# Patient Record
Sex: FEMALE | Race: WHITE | URBAN - METROPOLITAN AREA
[De-identification: names, ages, dates, MRNs, and addresses within clinical notes are randomized per-mention and may not be internally consistent; named-entity substitution may affect disease eponyms.]

---

## 2020-06-04 ENCOUNTER — SURGERY (OUTPATIENT)
Dept: URBAN - METROPOLITAN AREA SURGERY 19 | Facility: SURGERY | Age: 60
End: 2020-06-04
Payer: MEDICAID

## 2020-06-04 ENCOUNTER — NEW PATIENT (OUTPATIENT)
Dept: URBAN - METROPOLITAN AREA CLINIC 56 | Facility: CLINIC | Age: 60
End: 2020-06-04
Payer: MEDICAID

## 2020-06-04 DIAGNOSIS — H40.211 ACUTE ANGLE-CLOSURE GLAUCOMA, RIGHT EYE: ICD-10-CM

## 2020-06-04 DIAGNOSIS — R73.03 OTHER ABNORMAL GLUCOSE: Primary | ICD-10-CM

## 2020-06-04 PROCEDURE — 99203 OFFICE O/P NEW LOW 30 MIN: CPT | Performed by: OPHTHALMOLOGY

## 2020-06-04 PROCEDURE — 92250 FUNDUS PHOTOGRAPHY W/I&R: CPT | Performed by: OPHTHALMOLOGY

## 2020-06-04 PROCEDURE — 66761 REVISION OF IRIS: CPT | Performed by: OPHTHALMOLOGY

## 2020-06-04 ASSESSMENT — INTRAOCULAR PRESSURE
OD: 41
OD: 43
OD: 22
OD: 31
OS: 18
OD: 41
OD: 31
OD: 43
OD: 22
OS: 18

## 2020-06-05 ENCOUNTER — FOLLOW UP ESTABLISHED (OUTPATIENT)
Dept: URBAN - METROPOLITAN AREA CLINIC 10 | Facility: CLINIC | Age: 60
End: 2020-06-05
Payer: MEDICAID

## 2020-06-05 PROCEDURE — 92002 INTRM OPH EXAM NEW PATIENT: CPT | Performed by: OPTOMETRIST

## 2020-06-05 ASSESSMENT — INTRAOCULAR PRESSURE
OD: 12
OS: 12

## 2020-06-15 ENCOUNTER — SURGERY (OUTPATIENT)
Dept: URBAN - METROPOLITAN AREA SURGERY 19 | Facility: SURGERY | Age: 60
End: 2020-06-15
Payer: MEDICAID

## 2020-06-15 ENCOUNTER — FOLLOW UP ESTABLISHED (OUTPATIENT)
Dept: URBAN - METROPOLITAN AREA CLINIC 44 | Facility: CLINIC | Age: 60
End: 2020-06-15
Payer: MEDICAID

## 2020-06-15 PROCEDURE — 99024 POSTOP FOLLOW-UP VISIT: CPT | Performed by: OPHTHALMOLOGY

## 2020-06-15 PROCEDURE — 66761 REVISION OF IRIS: CPT | Performed by: OPHTHALMOLOGY

## 2020-06-15 PROCEDURE — 92020 GONIOSCOPY: CPT | Performed by: OPHTHALMOLOGY

## 2020-06-15 RX ORDER — PREDNISOLONE ACETATE 10 MG/ML
1 % SUSPENSION/ DROPS OPHTHALMIC
Qty: 1 | Refills: 4 | Status: INACTIVE
Start: 2020-06-15 | End: 2020-09-29

## 2020-06-15 RX ORDER — TRAVOPROST 0.04 MG/ML
0.004 % SOLUTION/ DROPS OPHTHALMIC
Qty: 1 | Refills: 3 | Status: INACTIVE
Start: 2020-06-15 | End: 2020-07-07

## 2020-06-15 RX ORDER — ACETAZOLAMIDE 250 MG/1
250 MG TABLET ORAL
Qty: 60 | Refills: 1 | Status: INACTIVE
Start: 2020-06-15 | End: 2020-06-16

## 2020-06-15 RX ORDER — BRINZOLAMIDE/BRIMONIDINE TARTRATE 10; 2 MG/ML; MG/ML
SUSPENSION/ DROPS OPHTHALMIC
Qty: 1 | Refills: 3 | Status: INACTIVE
Start: 2020-06-15 | End: 2020-09-29

## 2020-06-15 ASSESSMENT — INTRAOCULAR PRESSURE
OD: 15
OD: 15
OD: 45
OS: 18
OS: 18
OD: 45

## 2020-06-16 ENCOUNTER — FOLLOW UP ESTABLISHED (OUTPATIENT)
Dept: URBAN - METROPOLITAN AREA CLINIC 10 | Facility: CLINIC | Age: 60
End: 2020-06-16
Payer: MEDICAID

## 2020-06-16 PROCEDURE — 99024 POSTOP FOLLOW-UP VISIT: CPT | Performed by: OPHTHALMOLOGY

## 2020-06-16 RX ORDER — ACETAZOLAMIDE 250 MG/1
250 MG TABLET ORAL
Qty: 30 | Refills: 1 | Status: INACTIVE
Start: 2020-06-16 | End: 2020-10-01

## 2020-06-16 ASSESSMENT — INTRAOCULAR PRESSURE
OD: 12
OS: 14

## 2020-06-23 ENCOUNTER — SURGERY (OUTPATIENT)
Dept: URBAN - METROPOLITAN AREA SURGERY 5 | Facility: SURGERY | Age: 60
End: 2020-06-23
Payer: MEDICAID

## 2020-06-23 ENCOUNTER — POST OP (OUTPATIENT)
Dept: URBAN - METROPOLITAN AREA CLINIC 10 | Facility: CLINIC | Age: 60
End: 2020-06-23
Payer: MEDICAID

## 2020-06-23 DIAGNOSIS — H40.032 ANATOMICAL NARROW ANGLE, LEFT EYE: Primary | ICD-10-CM

## 2020-06-23 DIAGNOSIS — H25.13 AGE-RELATED NUCLEAR CATARACT, BILATERAL: ICD-10-CM

## 2020-06-23 PROCEDURE — 99024 POSTOP FOLLOW-UP VISIT: CPT | Performed by: OPHTHALMOLOGY

## 2020-06-23 PROCEDURE — 66761 REVISION OF IRIS: CPT | Performed by: OPHTHALMOLOGY

## 2020-06-23 ASSESSMENT — INTRAOCULAR PRESSURE
OD: 15
OS: 15
OS: 15
OD: 15

## 2020-06-30 ENCOUNTER — FOLLOW UP ESTABLISHED (OUTPATIENT)
Dept: URBAN - METROPOLITAN AREA CLINIC 10 | Facility: CLINIC | Age: 60
End: 2020-06-30
Payer: MEDICAID

## 2020-06-30 PROCEDURE — 99024 POSTOP FOLLOW-UP VISIT: CPT | Performed by: OPHTHALMOLOGY

## 2020-06-30 ASSESSMENT — KERATOMETRY
OS: 43.00
OD: 43.25

## 2020-06-30 ASSESSMENT — INTRAOCULAR PRESSURE
OS: 16
OD: 13

## 2020-07-06 ENCOUNTER — Encounter (OUTPATIENT)
Dept: URBAN - METROPOLITAN AREA CLINIC 10 | Facility: CLINIC | Age: 60
End: 2020-07-06
Payer: MEDICAID

## 2020-07-06 PROCEDURE — 92025 CPTRIZED CORNEAL TOPOGRAPHY: CPT | Performed by: OPHTHALMOLOGY

## 2020-07-06 PROCEDURE — 99213 OFFICE O/P EST LOW 20 MIN: CPT | Performed by: PHYSICIAN ASSISTANT

## 2020-07-07 ENCOUNTER — FOLLOW UP ESTABLISHED (OUTPATIENT)
Dept: URBAN - METROPOLITAN AREA CLINIC 10 | Facility: CLINIC | Age: 60
End: 2020-07-07
Payer: MEDICAID

## 2020-07-07 DIAGNOSIS — H52.223 REGULAR ASTIGMATISM, BILATERAL: ICD-10-CM

## 2020-07-07 PROCEDURE — 92083 EXTENDED VISUAL FIELD XM: CPT | Performed by: OPHTHALMOLOGY

## 2020-07-07 PROCEDURE — 92012 INTRM OPH EXAM EST PATIENT: CPT | Performed by: OPHTHALMOLOGY

## 2020-07-07 PROCEDURE — 92133 CPTRZD OPH DX IMG PST SGM ON: CPT | Performed by: OPHTHALMOLOGY

## 2020-07-07 PROCEDURE — 92134 CPTRZ OPH DX IMG PST SGM RTA: CPT | Performed by: OPHTHALMOLOGY

## 2020-07-07 PROCEDURE — 76514 ECHO EXAM OF EYE THICKNESS: CPT | Performed by: OPHTHALMOLOGY

## 2020-07-07 RX ORDER — TRAVOPROST 0.04 MG/ML
0.004 % SOLUTION/ DROPS OPHTHALMIC
Qty: 1 | Refills: 3 | Status: INACTIVE
Start: 2020-07-07 | End: 2020-09-29

## 2020-07-07 ASSESSMENT — VISUAL ACUITY
OS: 20/20
OD: 20/20

## 2020-07-14 ENCOUNTER — SURGERY (OUTPATIENT)
Dept: URBAN - METROPOLITAN AREA SURGERY 5 | Facility: SURGERY | Age: 60
End: 2020-07-14
Payer: MEDICAID

## 2020-07-14 PROCEDURE — 66984 XCAPSL CTRC RMVL W/O ECP: CPT | Performed by: OPHTHALMOLOGY

## 2020-07-15 ENCOUNTER — POST OP (OUTPATIENT)
Dept: URBAN - METROPOLITAN AREA CLINIC 10 | Facility: CLINIC | Age: 60
End: 2020-07-15

## 2020-07-15 PROCEDURE — 99024 POSTOP FOLLOW-UP VISIT: CPT | Performed by: OPTOMETRIST

## 2020-07-15 ASSESSMENT — INTRAOCULAR PRESSURE
OD: 13
OS: 12
OD: 12

## 2020-07-22 ENCOUNTER — POST OP (OUTPATIENT)
Dept: URBAN - METROPOLITAN AREA CLINIC 10 | Facility: CLINIC | Age: 60
End: 2020-07-22

## 2020-07-22 PROCEDURE — 99024 POSTOP FOLLOW-UP VISIT: CPT | Performed by: OPTOMETRIST

## 2020-07-22 ASSESSMENT — INTRAOCULAR PRESSURE
OS: 15
OD: 13

## 2020-07-22 ASSESSMENT — VISUAL ACUITY
OS: 20/25
OD: 20/25

## 2020-08-11 ENCOUNTER — POST OP (OUTPATIENT)
Dept: URBAN - METROPOLITAN AREA CLINIC 10 | Facility: CLINIC | Age: 60
End: 2020-08-11

## 2020-08-11 DIAGNOSIS — Z96.1 PRESENCE OF INTRAOCULAR LENS: Primary | ICD-10-CM

## 2020-08-11 PROCEDURE — 99024 POSTOP FOLLOW-UP VISIT: CPT | Performed by: OPTOMETRIST

## 2020-08-11 ASSESSMENT — VISUAL ACUITY
OS: 20/30
OD: 20/25

## 2020-08-11 ASSESSMENT — INTRAOCULAR PRESSURE
OD: 16
OS: 15

## 2020-09-29 ENCOUNTER — FOLLOW UP ESTABLISHED (OUTPATIENT)
Dept: URBAN - METROPOLITAN AREA CLINIC 44 | Facility: CLINIC | Age: 60
End: 2020-09-29
Payer: MEDICAID

## 2020-09-29 DIAGNOSIS — H25.12 AGE-RELATED NUCLEAR CATARACT, LEFT EYE: ICD-10-CM

## 2020-09-29 DIAGNOSIS — H26.491 OTHER SECONDARY CATARACT, RIGHT EYE: ICD-10-CM

## 2020-09-29 PROCEDURE — 92012 INTRM OPH EXAM EST PATIENT: CPT | Performed by: OPTOMETRIST

## 2020-09-29 PROCEDURE — 92134 CPTRZ OPH DX IMG PST SGM RTA: CPT | Performed by: OPTOMETRIST

## 2020-09-29 RX ORDER — URSODIOL 500 MG/1
500 MG TABLET ORAL
Qty: 0 | Refills: 0 | Status: ACTIVE
Start: 2020-09-29

## 2020-09-29 ASSESSMENT — INTRAOCULAR PRESSURE
OS: 14
OD: 11

## 2020-09-29 ASSESSMENT — VISUAL ACUITY
OD: 20/20
OS: 20/25

## 2020-09-29 ASSESSMENT — KERATOMETRY
OS: 42.25
OD: 42.50

## 2020-10-01 ENCOUNTER — Encounter (OUTPATIENT)
Dept: URBAN - METROPOLITAN AREA CLINIC 44 | Facility: CLINIC | Age: 60
End: 2020-10-01
Payer: MEDICAID

## 2020-10-01 PROCEDURE — 99213 OFFICE O/P EST LOW 20 MIN: CPT | Performed by: PHYSICIAN ASSISTANT

## 2020-10-08 ENCOUNTER — FOLLOW UP ESTABLISHED (OUTPATIENT)
Dept: URBAN - METROPOLITAN AREA CLINIC 44 | Facility: CLINIC | Age: 60
End: 2020-10-08
Payer: MEDICAID

## 2020-10-08 DIAGNOSIS — H04.123 AGE-RELATED NUCLEAR CATARACT, LEFT EYE: ICD-10-CM

## 2020-10-08 DIAGNOSIS — H52.222 REGULAR ASTIGMATISM, LEFT EYE: ICD-10-CM

## 2020-10-08 PROCEDURE — 92014 COMPRE OPH EXAM EST PT 1/>: CPT | Performed by: OPHTHALMOLOGY

## 2020-10-08 ASSESSMENT — INTRAOCULAR PRESSURE
OS: 16
OD: 13

## 2020-10-15 ENCOUNTER — SURGERY (OUTPATIENT)
Dept: URBAN - METROPOLITAN AREA SURGERY 19 | Facility: SURGERY | Age: 60
End: 2020-10-15
Payer: MEDICAID

## 2020-10-15 PROCEDURE — 66984 XCAPSL CTRC RMVL W/O ECP: CPT | Performed by: OPHTHALMOLOGY

## 2020-10-16 ENCOUNTER — POST OP (OUTPATIENT)
Dept: URBAN - METROPOLITAN AREA CLINIC 44 | Facility: CLINIC | Age: 60
End: 2020-10-16
Payer: MEDICAID

## 2020-10-16 PROCEDURE — 99024 POSTOP FOLLOW-UP VISIT: CPT | Performed by: OPTOMETRIST

## 2020-10-16 ASSESSMENT — INTRAOCULAR PRESSURE: OS: 15

## 2020-11-30 ENCOUNTER — POST OP (OUTPATIENT)
Dept: URBAN - METROPOLITAN AREA CLINIC 44 | Facility: CLINIC | Age: 60
End: 2020-11-30
Payer: MEDICAID

## 2020-11-30 PROCEDURE — 99024 POSTOP FOLLOW-UP VISIT: CPT | Performed by: OPTOMETRIST

## 2020-11-30 ASSESSMENT — INTRAOCULAR PRESSURE
OS: 18
OD: 18

## 2020-11-30 ASSESSMENT — VISUAL ACUITY
OD: 20/20
OS: 20/20

## 2020-12-21 ENCOUNTER — FOLLOW UP ESTABLISHED (OUTPATIENT)
Dept: URBAN - METROPOLITAN AREA CLINIC 44 | Facility: CLINIC | Age: 60
End: 2020-12-21
Payer: MEDICAID

## 2020-12-21 DIAGNOSIS — Z41.1 ENCOUNTER FOR COSMETIC SURGERY: ICD-10-CM

## 2020-12-21 PROCEDURE — 92081 LIMITED VISUAL FIELD XM: CPT | Performed by: OPHTHALMOLOGY

## 2020-12-21 PROCEDURE — 92285 EXTERNAL OCULAR PHOTOGRAPHY: CPT | Performed by: OPHTHALMOLOGY

## 2020-12-21 PROCEDURE — 99214 OFFICE O/P EST MOD 30 MIN: CPT | Performed by: OPHTHALMOLOGY

## 2020-12-21 RX ORDER — ERYTHROMYCIN 5 MG/G
OINTMENT OPHTHALMIC
Qty: 2 | Refills: 1 | Status: ACTIVE
Start: 2020-12-21

## 2021-02-16 ENCOUNTER — ADULT PHYSICAL (OUTPATIENT)
Dept: URBAN - METROPOLITAN AREA CLINIC 44 | Facility: CLINIC | Age: 61
End: 2021-02-16
Payer: MEDICAID

## 2021-02-16 DIAGNOSIS — Z01.818 ENCOUNTER FOR OTHER PREPROCEDURAL EXAMINATION: Primary | ICD-10-CM

## 2021-02-16 PROCEDURE — 99213 OFFICE O/P EST LOW 20 MIN: CPT | Performed by: PHYSICIAN ASSISTANT

## 2021-03-01 ENCOUNTER — SURGERY (OUTPATIENT)
Dept: URBAN - METROPOLITAN AREA SURGERY 19 | Facility: SURGERY | Age: 61
End: 2021-03-01
Payer: MEDICAID

## 2021-03-11 ENCOUNTER — POST OP (OUTPATIENT)
Dept: URBAN - METROPOLITAN AREA CLINIC 44 | Facility: CLINIC | Age: 61
End: 2021-03-11
Payer: MEDICAID

## 2021-03-11 DIAGNOSIS — H02.423 MYOGENIC PTOSIS OF BILATERAL EYELIDS: ICD-10-CM

## 2021-03-11 PROCEDURE — 99024 POSTOP FOLLOW-UP VISIT: CPT | Performed by: OPTOMETRIST

## 2021-05-27 ENCOUNTER — POST-OPERATIVE VISIT (OUTPATIENT)
Dept: URBAN - METROPOLITAN AREA CLINIC 44 | Facility: CLINIC | Age: 61
End: 2021-05-27
Payer: MEDICAID

## 2021-05-27 DIAGNOSIS — Z48.89 ENCOUNTER FOR OTHER SPECIFIED SURGICAL AFTERCARE: Primary | ICD-10-CM

## 2021-05-27 PROCEDURE — 99024 POSTOP FOLLOW-UP VISIT: CPT | Performed by: OPHTHALMOLOGY

## 2021-05-27 NOTE — IMPRESSION/PLAN
Impression: S/P External Levator Resection - Both Upper Lid OU - 87 Days. Encounter for other specified surgical aftercare  Z48.89. Excellent post op course   Post operative instructions reviewed - Plan: patient very happy with results. All questions answered. She would like to return for botox and filler. Plan for Restylane Kysse to lips (1 vial) and 9 units OU to crow's feet, 16 units to glabella, and 8-12 units in brow). All questions answered.

## 2021-06-17 ENCOUNTER — OFFICE VISIT (OUTPATIENT)
Dept: URBAN - METROPOLITAN AREA CLINIC 44 | Facility: CLINIC | Age: 61
End: 2021-06-17
Payer: MEDICAID

## 2021-06-17 PROCEDURE — RSILK RESTYLANE SILK: CUSTOM | Performed by: OPHTHALMOLOGY

## 2021-06-17 NOTE — IMPRESSION/PLAN
Impression: Encounter for cosmetic surgery: Z41.1. Plan: Injected Restylane Kysse into the lips Injected 9 units of cosmetic Botox into the crows feet region. Risks and benefits discussed with the patient today, consent was signed. Patient advised to ice the lips for 24-48 hours when she can.

## 2021-07-08 ENCOUNTER — OFFICE VISIT (OUTPATIENT)
Dept: URBAN - METROPOLITAN AREA CLINIC 33 | Facility: CLINIC | Age: 61
End: 2021-07-08

## 2021-07-08 PROCEDURE — 99212 OFFICE O/P EST SF 10 MIN: CPT | Performed by: OPHTHALMOLOGY

## 2021-07-12 NOTE — IMPRESSION/PLAN
Impression: Encounter for cosmetic surgery: Z41.1. Plan: Pt s/p  Restylane Kysse injected into the lips Discussed with patient that I do not recommend any more filler injected to lips. Pt stated she agrees and may consider in the future. RTC PRN or sooner.